# Patient Record
Sex: MALE | Race: WHITE
[De-identification: names, ages, dates, MRNs, and addresses within clinical notes are randomized per-mention and may not be internally consistent; named-entity substitution may affect disease eponyms.]

---

## 2020-09-14 ENCOUNTER — HOSPITAL ENCOUNTER (EMERGENCY)
Dept: HOSPITAL 41 - JD.ED | Age: 45
Discharge: HOME | End: 2020-09-14
Payer: COMMERCIAL

## 2020-09-14 DIAGNOSIS — S61.213A: ICD-10-CM

## 2020-09-14 DIAGNOSIS — S61.211A: Primary | ICD-10-CM

## 2020-09-14 PROCEDURE — 96372 THER/PROPH/DIAG INJ SC/IM: CPT

## 2020-09-14 PROCEDURE — 12002 RPR S/N/AX/GEN/TRNK2.6-7.5CM: CPT

## 2020-09-14 PROCEDURE — 90715 TDAP VACCINE 7 YRS/> IM: CPT

## 2020-09-14 PROCEDURE — 73130 X-RAY EXAM OF HAND: CPT

## 2020-09-14 PROCEDURE — 99283 EMERGENCY DEPT VISIT LOW MDM: CPT

## 2020-09-14 PROCEDURE — 90471 IMMUNIZATION ADMIN: CPT

## 2020-09-14 NOTE — CR
Left hand: 4 views left hand were obtained.

 

Comparison: No previous hand study.

 

Diffuse soft tissue air seen throughout the second through fourth 

fingers as well as around the metacarpal region.  No acute fracture or

 other bony abnormality is seen.  No radiopaque foreign object is 

seen.

 

Impression:

1.  Diffuse soft tissue air as noted above.

 

Diagnostic code #2

 

This report was dictated in MDT

## 2020-09-14 NOTE — EDM.PDOC
ED HPI GENERAL MEDICAL PROBLEM





- General


Chief Complaint: Upper Extremity Injury/Pain


Stated Complaint: L MIDDLE/POINTER FINGER LAC


Time Seen by Provider: 09/14/20 13:05


Source of Information: Reports: Patient


History Limitations: Reports: No Limitations





- History of Present Illness


INITIAL COMMENTS - FREE TEXT/NARRATIVE: 





The patient presents with lacerations to his left hand.  He was using a high 

pressure water equipment used [place] the [place]. 

There was some trouble with the machine and he accidently ran his hand over the 

nozzle.  He is right handed.  He is not sure of his last tetanus.  He has 

lacerations to his left 2nd, 3rd and 4th fingers on the volar side.  He can 

still fell me touching him and he has good capillary refill.


Onset: Sudden


Duration: Minutes:


Location: Reports: Upper Extremity, Left (hand)


Quality: Reports: Sharp


Severity: Moderate


Improves with: Reports: Immobilization


Worsens with: Reports: Movement


Context: Reports: Trauma


Associated Symptoms: Reports: No Other Symptoms


  ** Left Finger-Index


Pain Score (Numeric/FACES): 10





- Related Data


                                    Allergies











Allergy/AdvReac Type Severity Reaction Status Date / Time


 


No Known Allergies Allergy   Verified 09/14/20 13:06











Home Meds: 


                                    Home Meds





Losartan [Cozaar] 100 mg PO DAILY 09/14/20 [History]


Metoprolol Succinate 50 mg PO DAILY 09/14/20 [History]


Omeprazole 20 mg PO DAILY 09/14/20 [History]


amLODIPine [Norvasc] 5 mg PO DAILY 09/14/20 [History]


cephALEXin [Keflex] 500 mg PO Q6HR #40 capsule 09/14/20 [Rx]











Past Medical History


Cardiovascular History: Reports: Hypertension


Gastrointestinal History: Reports: GERD





- Infectious Disease History


Infectious Disease History: Reports: Chicken Pox





Social & Family History





- Tobacco Use


Smoking Status *Q: Never Smoker





- Caffeine Use


Caffeine Use: Reports: Coffee, Soda


Other Caffeine Use: diet cola; energy ice tea





- Recreational Drug Use


Recreational Drug Use: No





Review of Systems





- Review of Systems


Review Of Systems: See Below


Constitutional: Reports: No Symptoms


Eyes: Reports: No Symptoms


Ears: Reports: No Symptoms


Nose: Reports: No Symptoms


Mouth/Throat: Reports: No Symptoms


Respiratory: Reports: No Symptoms


Cardiovascular: Reports: No Symptoms


GI/Abdominal: Reports: No Symptoms


Genitourinary: Reports: No Symptoms


Musculoskeletal: Reports: Other (Left hand lacerations)





ED EXAM, GENERAL





- Physical Exam


Exam: See Below


Exam Limited By: No Limitations


General Appearance: Alert, No Apparent Distress


Ears: Normal External Exam


Nose: Normal Inspection


Head: Atraumatic, Normocephalic


Neck: Normal Inspection


Respiratory/Chest: No Respiratory Distress


Extremities: Other (The left index finger has a 2.5cm laceration to the volar 

aspect with good sensation and capillary refill.  Middle finger has a 3.25cm 

laceration to the vilar aspect with good sensation and movement.  The ring 

finger has a 0.5cm laceration to the volar aspect with good sensation and 

capillar refill.)





ED TRAUMA EXTREMITY PROCEDURES





- Laceration/Wound Repair


  ** Left Digit - 3rd (Middle)


Lac/Wound Length In cm: 3.2


Appearance: Subcutaneous, Irregular


Distal NVT: Neuro & Vascular Intact, No Tendon Injury


Anesthetic Type: Digital


Local Anesthesia - Lidocaine (Xylocaine): 1% Plain


Skin Prep: Providone-Iodine (Betadine), Saline


Exploration/Debridement/Repair: Wound Explored, In a Bloodless Field, Explored 

to Base


Closed With: Sutures


Suture Size: 4-0


# of Sutures: 3


Suture Type: Nylon, Interrupted, Simple


Sterile Dressing Applied: Nurse


Tetanus Status Addressed: Yes


Complications: No





- Splinting


  ** Left Upper Extremity


Splint Site: left hand


Pre-Procedure NV Status: Normal


Post-Procedure NV Status: Normal


Splint Material: Fiberglass


Splint Design: Volar


Applied & Form Fitted By: Provider


Provider Post-Splint Application NV Check: NV Status Normal, Good Position


Complications: No





Course





- Vital Signs


Last Recorded V/S: 


                                Last Vital Signs











Temp  98.7 F   09/14/20 13:11


 


Pulse  85   09/14/20 13:11


 


Resp  20   09/14/20 13:11


 


BP  138/75   09/14/20 13:11


 


Pulse Ox  97   09/14/20 13:11














- Orders/Labs/Meds


Orders: 


                               Active Orders 24 hr











 Category Date Time Status


 


 Vaccines to be Administered [RC] PER UNIT ROUTINE Care  09/14/20 13:10 Active











Meds: 


Medications














Discontinued Medications














Generic Name Dose Route Start Last Admin





  Trade Name Freq  PRN Reason Stop Dose Admin


 


Ceftriaxone Sodium 1 gm/  0 gm  09/14/20 14:30 





  Lidocaine HCl 2.1 ml  IM  09/14/20 14:31 





  ONETIME ONE  


 


Diphtheria/Tetanus/Acell Pertussis  0.5 ml  09/14/20 13:10  09/14/20 14:11





  Adacel  IM  09/14/20 13:11  0.5 ml





  .ONCE ONE   Administration


 


Lidocaine HCl  10 ml  09/14/20 13:55  09/14/20 14:11





  Xylocaine 1%  INJECT  09/14/20 13:56  10 ml





  ONETIME ONE   Administration














- Re-Assessments/Exams


Free Text/Narrative Re-Assessment/Exam: 





09/14/20 14:07


I did an x-ray and there was diffuse soft tissue air seen throughout the second 

through fourth fingers as well as around the metacarpal region.  No acute 

fracture or other bony abnormality is seen.  No radiopaque foreign object is 

seen.





I called [facility] in [place] and talked with Dr Betts the hand surgeon on call

 and he wanted me to clean the wound out and loosely approximate the wounds and 

get him on antibiotics.  I ordered tetanus and rocephin IM and I will suture the

 wounds.


09/14/20 14:48


I sutured the wounds and I have splinted the patient.  He will get the rocephin 

and a prescription for keflex.





Departure





- Departure


Time of Disposition: 15:00


Disposition: Home, Self-Care 01


Condition: Good


Clinical Impression: 


 High pressure injury of left hand





Laceration of left index finger


Qualifiers:


 Encounter type: initial encounter Damage to nail status: without damage Foreign

 body presence: without foreign body Qualified Code(s): S61.211A - Laceration 

without foreign body of left index finger without damage to nail, initial 

encounter





Laceration of left middle finger


Qualifiers:


 Encounter type: initial encounter Damage to nail status: without damage Foreign

 body presence: without foreign body Qualified Code(s): S61.213A - Laceration 

without foreign body of left middle finger without damage to nail, initial 

encounter








- Discharge Information


*PRESCRIPTION DRUG MONITORING PROGRAM REVIEWED*: Not Applicable


*COPY OF PRESCRIPTION DRUG MONITORING REPORT IN PATIENT [facility]: Not Applicable


Prescriptions: 


cephALEXin [Keflex] 500 mg PO Q6HR #40 capsule


Referrals: 


Addie Carcamo MD [Primary Care Provider] - 


Osman Ryan MD [Ordering Only Provider] - 1 Week


Forms:  ED Department Discharge


Additional Instructions: 


Take the keflex every 6 hours for 10 days.  Soak your hand in warm soapy water 2

times per day and apply antibiotic ointment after.  Have the sutures removed in 

a week.  Look for any signs of infection such as redness, swelling, pain or 

discharge.





Sepsis Event Note (ED)





- Evaluation


Sepsis Screening Result: No Definite Risk





- Focused Exam


Vital Signs: 


                                   Vital Signs











  Temp Pulse Resp BP Pulse Ox


 


 09/14/20 13:11  98.7 F  85  20  138/75  97














- My Orders


Last 24 Hours: 


My Active Orders





09/14/20 13:10


Vaccines to be Administered [RC] PER UNIT ROUTINE 














- Assessment/Plan


Last 24 Hours: 


My Active Orders





09/14/20 13:10


Vaccines to be Administered [RC] PER UNIT ROUTINE 














ED LACERATION PROCEDURES





- Laceration/Wound Repair


  ** Left Digit - 3rd (Middle)


Lac/wound length in cm: 2.5


Appearance: Subcutaneous, Irregular


Distal NVT: Neuro & Vascular Intact, No Tendon Injury


Anesthetic Type: Digital


Local Anesthesia - Lidocaine (Xylocaine): 1% Plain


Skin Prep: Providone-Iodine (Betadine), Saline


Exploration/Debridement/Repair: Wound Explored, In a Bloodless Field, Explored 

to Base


Closed with: Sutures


Suture Size: 4-0


# of Sutures: 2


Suture Type: Nylon, Interrupted, Simple


Tetanus Status Addressed: Yes


Complications: No